# Patient Record
Sex: MALE | Race: BLACK OR AFRICAN AMERICAN | Employment: FULL TIME | URBAN - METROPOLITAN AREA
[De-identification: names, ages, dates, MRNs, and addresses within clinical notes are randomized per-mention and may not be internally consistent; named-entity substitution may affect disease eponyms.]

---

## 2022-08-10 ENCOUNTER — CLINICAL DOCUMENTATION (OUTPATIENT)
Dept: NEUROSURGERY | Age: 57
End: 2022-08-10

## 2022-08-10 ENCOUNTER — OFFICE VISIT (OUTPATIENT)
Dept: NEUROSURGERY | Age: 57
End: 2022-08-10

## 2022-08-10 VITALS
HEART RATE: 68 BPM | OXYGEN SATURATION: 97 % | SYSTOLIC BLOOD PRESSURE: 141 MMHG | DIASTOLIC BLOOD PRESSURE: 79 MMHG | WEIGHT: 213 LBS | BODY MASS INDEX: 29.82 KG/M2 | TEMPERATURE: 97.7 F | HEIGHT: 71 IN

## 2022-08-10 DIAGNOSIS — M54.2 NECK PAIN: ICD-10-CM

## 2022-08-10 DIAGNOSIS — G89.29 CHRONIC LOW BACK PAIN, UNSPECIFIED BACK PAIN LATERALITY, UNSPECIFIED WHETHER SCIATICA PRESENT: Primary | ICD-10-CM

## 2022-08-10 DIAGNOSIS — M54.50 CHRONIC LOW BACK PAIN, UNSPECIFIED BACK PAIN LATERALITY, UNSPECIFIED WHETHER SCIATICA PRESENT: Primary | ICD-10-CM

## 2022-08-10 RX ORDER — NAPROXEN 500 MG/1
TABLET ORAL
COMMUNITY
Start: 2022-07-31

## 2022-08-10 RX ORDER — HYDROXYZINE PAMOATE 25 MG/1
25 CAPSULE ORAL ONCE
Qty: 1 CAPSULE | Refills: 0 | Status: SHIPPED | OUTPATIENT
Start: 2022-08-10 | End: 2022-08-10

## 2022-08-10 RX ORDER — SILDENAFIL 100 MG/1
TABLET, FILM COATED ORAL
COMMUNITY
Start: 2022-06-01

## 2022-08-10 RX ORDER — CYCLOBENZAPRINE HCL 5 MG
TABLET ORAL
COMMUNITY
Start: 2022-07-31

## 2022-08-10 RX ORDER — DIAZEPAM 2 MG/1
1 TABLET ORAL ONCE
Qty: 1 TABLET | Refills: 0 | Status: SHIPPED | OUTPATIENT
Start: 2022-08-10 | End: 2022-08-10

## 2022-08-10 ASSESSMENT — PATIENT HEALTH QUESTIONNAIRE - PHQ9
SUM OF ALL RESPONSES TO PHQ QUESTIONS 1-9: 0
SUM OF ALL RESPONSES TO PHQ9 QUESTIONS 1 & 2: 0
SUM OF ALL RESPONSES TO PHQ QUESTIONS 1-9: 0
2. FEELING DOWN, DEPRESSED OR HOPELESS: 0
SUM OF ALL RESPONSES TO PHQ QUESTIONS 1-9: 0
1. LITTLE INTEREST OR PLEASURE IN DOING THINGS: 0
SUM OF ALL RESPONSES TO PHQ QUESTIONS 1-9: 0

## 2022-08-10 NOTE — PROGRESS NOTES
Lauri Wylie with [facility] contacted our office today. He requested the office note and MRI orders from today's appointment. Per his request, I sent the office note, MRI Cervical Spine order, and the MRI Lumbar Spine order to his email. [email or web address]

## 2022-08-10 NOTE — PROGRESS NOTES
[facility] NOTE:   History of Present Illness:    CC: Neck and low back pain    Matthew Griffin is a 62 y.o. right handed male who presents today for evaluation of his neck and low back pain. Patient states that he has been on disability for about 3 to 5 years. Patient states that he has been disabled due to neck and low back pain. 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 Patient states he was fine as long as he did not have to do any heavy lifting. Patient states that more recently he has been having to set up an breakdown for certain events. Patient states that on July 9 he had a breakdown and set up for an event and it caused his neck and low back pain to begin to hurt even worse than normal.  Patient states his low back pain is worse than his neck pain ranging from an 8 to a 9 out of 10 whereas his neck pain is normally a 7 or 8 out of 10. Patient states the pain is focused in his low back almost in the SI joint region on both sides. Patient states he does have an occasional pain in his outer left calf. Patient states he gets pain around his C7 bony process in his neck that runs down out into his shoulder muscles. Patient states that 8 or 9 years ago he did undergo injections with a pain management doctor at [facility] but after getting 3 injections was unable to return. Patient states since this event happened he has been stretching and soaking in the bathtub. Patient states he is currently in physical therapy and is about to complete 4 weeks. Patient states that the therapy does ease off his pain until he has to return back to work. No past medical history on file. No past surgical history on file.   No Known Allergies   Family History   Problem Relation Age of Onset    Breast Cancer Mother     Prostate Cancer Father       Social History     Socioeconomic History    Marital status:  Spouse name: Not on file    Number of children: Not on file    Years of education: Not on file    Highest education level: Not on file   Occupational History    Not on file   Tobacco Use    Smoking status: Every Day     Types: Cigarettes     Passive exposure: Current    Smokeless tobacco: Never   Vaping Use    Vaping Use: Never used   Substance and Sexual Activity    Alcohol use: Never    Drug use: Not on file    Sexual activity: Not on file   Other Topics Concern    Not on file   Social History Narrative    Not on file     Social Determinants of Health     Financial Resource Strain: Not on file   Food Insecurity: Not on file   Transportation Needs: Not on file   Physical Activity: Not on file   Stress: Not on file   Social Connections: Not on file   Intimate Partner Violence: Not on file   Housing Stability: Not on file     Current Outpatient Medications   Medication Sig Dispense Refill    naproxen (NAPROSYN) 500 MG tablet TAKE 1 TABLET BY MOUTH EVERY 12 HOURS WITH FOOD FOR 10 DAYS      cyclobenzaprine (FLEXERIL) 5 MG tablet TAKE 1 TABLET BY MOUTH THREE TIMES DAILY AS NEEDED FOR 5 DAYS - FOR NECK PAIN      sildenafil (VIAGRA) 100 MG tablet TAKE 1 TABLET (100 MG) BY MOUTH ONCE DAILY AS NEEDED approximately 1 hour BEFORE sexual activity       No current facility-administered medications for this visit. There is no problem list on file for this patient. ROS Review of Systems    Constitutional:                    No recent weight changes, fever, fatigue, sleep difficulties, loss of appetite   ENT/Mouth:  No hearing loss, No ringing in the ears, chronic sinus problem, nose bleeds,sore throat, voice change, hoarseness, swollen glands in neck, or difficulties with chewing and swallowing. Cardiovascular:  No chest pain/angina pectoris, palpitations, swelling of feet/ankles/hands, or calf pain while walking.      Respiratory: No chronic or frequent coughs, spitting up blood, shortness of breath, No asthma, or wheezing. Gastrointestinal: No a bdominal pain, heartburn, nausea, vomiting, constipation, or frequent diarrhea     Genitourinary: No frequent urination, burning or painful urination, or blood in urine     Musculoskeletal:   POS low back pain and neck pain     Integument:   No rash/itching     Neurological:   Dizziness/vertigo, No numbness/tingling sensation, tremors, No weakness in limbs, frequent or recurring headaches, memory loss or confusion. Physical Exam:    General: No acute distress  Head normocephalic and atraumatic  Mood and affect appropriate  CV: Regular rate   Resp: No increased work of breathing  Skin: warm and dry   Awake, alert, and oriented   Speech fluent  Eyes open spontaneously   Face symmetric and tongue midline on protrusion  Sternocleidomastoid and trapezius 5/5  No mid-line cervical, thoracic, or lumbar tenderness to palpation   Patient with strength exam as follows:   Upper Extremities: Right Left      Deltoid  5 5    Biceps  5 5    Triceps  5 5      5 5   Hand Intrinsics  5 5  Wrist flexors/extensors  5 5     Lower Extremities:      Hip Flex 5 5    Quads  5 5    Hamstrings 5 5    Dorsiflex 5 5    Plantarflex 5 5    EHL  5 5  Sensation intact to light touch and pin-prick   DTR 2+  No clonus or babinski present   No Gee's sign present bilaterally   Gait normal    Assessment & Plan:  Disha Moseley is a 62 y.o. male who presents to be evaluated for neck and low back pain that became worse while at work. I am sending the patient for a cervical and lumbar MRI without contrast.  I will send in an MRI cocktail because the patient states he has claustrophobia. Patient will follow up with me after the MRI is complete to review the imaging. No diagnosis found. Notes are transcribed with [employer], a medical voice recording dictation service, and may contain minor errors.     Dina Weeks, PAIGE  [id number]

## 2022-08-31 ENCOUNTER — OFFICE VISIT (OUTPATIENT)
Dept: NEUROSURGERY | Age: 57
End: 2022-08-31

## 2022-08-31 VITALS
SYSTOLIC BLOOD PRESSURE: 146 MMHG | HEIGHT: 71 IN | BODY MASS INDEX: 29.54 KG/M2 | DIASTOLIC BLOOD PRESSURE: 75 MMHG | OXYGEN SATURATION: 97 % | WEIGHT: 211 LBS | HEART RATE: 61 BPM | TEMPERATURE: 98.1 F

## 2022-08-31 DIAGNOSIS — M54.50 LOW BACK PAIN, UNSPECIFIED BACK PAIN LATERALITY, UNSPECIFIED CHRONICITY, UNSPECIFIED WHETHER SCIATICA PRESENT: ICD-10-CM

## 2022-08-31 DIAGNOSIS — G95.9 CERVICAL MYELOPATHY (HCC): Primary | ICD-10-CM

## 2022-08-31 RX ORDER — BUPRENORPHINE 5 UG/H
PATCH TRANSDERMAL
COMMUNITY
Start: 2022-08-18

## 2022-08-31 ASSESSMENT — PATIENT HEALTH QUESTIONNAIRE - PHQ9
SUM OF ALL RESPONSES TO PHQ QUESTIONS 1-9: 0
1. LITTLE INTEREST OR PLEASURE IN DOING THINGS: 0
SUM OF ALL RESPONSES TO PHQ QUESTIONS 1-9: 0
2. FEELING DOWN, DEPRESSED OR HOPELESS: 0
SUM OF ALL RESPONSES TO PHQ QUESTIONS 1-9: 0
SUM OF ALL RESPONSES TO PHQ9 QUESTIONS 1 & 2: 0
SUM OF ALL RESPONSES TO PHQ QUESTIONS 1-9: 0

## 2022-08-31 NOTE — PROGRESS NOTES
[facility] NOTE:   History of Present Illness:    CC: Cervical and lumbar MRI review    David Maldonado is a 62 y.o. here to review his cervical and lumbar MRI. Patient states that when he recently returned back to the workforce after being out on disability he hurt himself while having to set up and clean up after events. Patient states he gets a lot of pain in his low back that he did not have until he started having to clean up after events. Patient states that back in either 2012 or 2015 he was told that he had a bad neck but at that time did not undergo any surgical fixes but opted for neck injections. Patient states the injection helped with his pain and he continued to stay out on disability. Patient states he still having all the pain in his neck as well as some numbness and tingling in his hands. The lumbar MRI is unremarkable for any evidence of nerve compromise. 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 Patient states he is aware that he has had these structural problems in his neck but the pain that he is experiencing is new. Patient states that he is unsure if he would like to have a surgery to fix the structural issues in his neck. Patient states that he would like to be written out of work and is not sure that he would like to have a surgical fix but is interested in possibly trying injections. Patient is cautioned that due to the spinal cord compression and evidence of previous spinal cord injury on the cervical MRI postponing a surgery can be dangerous and possibly lead to permanent physical disabilities. Patient verbalized understanding but would like to get an injection to help with the pain.   I informed the patient that if he elects not to undergo a corrective surgery that I will not write him for disability because he will no longer be under my direct care. Patient is again encouraged to have a discussion with one of the surgeons about correcting the origin of his pain. No past medical history on file. No past surgical history on file. No Known Allergies   Family History   Problem Relation Age of Onset    Breast Cancer Mother     Prostate Cancer Father       Social History     Socioeconomic History    Marital status:      Spouse name: Not on file    Number of children: Not on file    Years of education: Not on file    Highest education level: Not on file   Occupational History    Not on file   Tobacco Use    Smoking status: Every Day     Types: Cigarettes     Passive exposure: Current    Smokeless tobacco: Never   Vaping Use    Vaping Use: Never used   Substance and Sexual Activity    Alcohol use: Never    Drug use: Not on file    Sexual activity: Not on file   Other Topics Concern    Not on file   Social History Narrative    Not on file     Social Determinants of Health     Financial Resource Strain: Not on file   Food Insecurity: Not on file   Transportation Needs: Not on file   Physical Activity: Not on file   Stress: Not on file   Social Connections: Not on file   Intimate Partner Violence: Not on file   Housing Stability: Not on file     Current Outpatient Medications   Medication Sig Dispense Refill    buprenorphine (BUPRENEX) 5 MCG/HR PTWK PLACE 1 PATCH TO SKIN TO SKIN ONCE A WEEK FOR 28 DAYS      naproxen (NAPROSYN) 500 MG tablet TAKE 1 TABLET BY MOUTH EVERY 12 HOURS WITH FOOD FOR 10 DAYS      cyclobenzaprine (FLEXERIL) 5 MG tablet TAKE 1 TABLET BY MOUTH THREE TIMES DAILY AS NEEDED FOR 5 DAYS - FOR NECK PAIN      sildenafil (VIAGRA) 100 MG tablet TAKE 1 TABLET (100 MG) BY MOUTH ONCE DAILY AS NEEDED approximately 1 hour BEFORE sexual activity       No current facility-administered medications for this visit. There is no problem list on file for this patient.          ROS Review of Systems    Constitutional:                    No recent weight changes, fever, fatigue, sleep difficulties, loss of appetite   ENT/Mouth:  No hearing loss, No ringing in the ears, chronic sinus problem, nose bleeds,sore throat, voice change, hoarseness, swollen glands in neck, or difficulties with chewing and swallowing. Cardiovascular:  No chest pain/angina pectoris, palpitations, swelling of feet/ankles/hands, or calf pain while walking. Respiratory: No chronic or frequent coughs, spitting up blood, shortness of breath, No asthma, or wheezing. Gastrointestinal: No a bdominal pain, heartburn, nausea, vomiting, constipation, or frequent diarrhea     Genitourinary: No frequent urination, burning or painful urination, or blood in urine     Musculoskeletal:   POS neck and low back pain     Integument:   No rash/itching     Neurological:   Dizziness/vertigo, No numbness/tingling sensation, tremors, No weakness in limbs, frequent or recurring headaches, memory loss or confusion. Physical Exam:    General: No acute distress  Head normocephalic and atraumatic  Mood and affect appropriate  CV: Regular rate   Resp: No increased work of breathing  Skin: warm and dry   Awake, alert, and oriented   Speech fluent  Eyes open spontaneously   Face symmetric and tongue midline on protrusion  Sternocleidomastoid and trapezius 5/5  No mid-line cervical, thoracic, or lumbar tenderness to palpation   Patient with strength exam as follows:   Upper Extremities: Right Left      Deltoid  5 5    Biceps  5 5    Triceps  5 5      5 5   Hand Intrinsics  5 5  Wrist flexors/extensors  5 5     Lower Extremities:      Hip Flex 5 5    Quads  5 5    Hamstrings 5 5    Dorsiflex 5 5    Plantarflex 5 5    EHL  5 5  Sensation intact to light touch and pin-prick   DTR 2+  No clonus or babinski present   No Gee's sign present bilaterally   Gait right sided limp    Assessment & Plan:  Jese Carlton is a 62 y.o. male who presents to review his cervical and lumbar MRI.   At this time I do not see a structural origin to the patient's low back pain. After reviewing the patient's cervical MRI I feel the patient needs to discuss his surgical options in order to prevent worsening physical disabilities associated with spinal cord compression such as difficulty walking, loss of bowel and bladder control, or difficulty in using his hands. The patient has agreed to sit down and discuss surgery with Dr. Elzbieta Powell. The patient has been informed that should he elect to not undergo a corrective procedure this office will not fill out any disability forms because he will no longer be under this office his care for his neck pain. No diagnosis found. Notes are transcribed with [employer], a medical voice recording dictation service, and may contain minor errors.     Bijan Lanier, PAIGE  [phone]

## 2022-09-01 ENCOUNTER — CLINICAL DOCUMENTATION (OUTPATIENT)
Dept: NEUROSURGERY | Age: 57
End: 2022-09-01
